# Patient Record
Sex: FEMALE | Race: WHITE | NOT HISPANIC OR LATINO | Employment: OTHER | URBAN - METROPOLITAN AREA
[De-identification: names, ages, dates, MRNs, and addresses within clinical notes are randomized per-mention and may not be internally consistent; named-entity substitution may affect disease eponyms.]

---

## 2023-12-04 ENCOUNTER — TRANSFERRED RECORDS (OUTPATIENT)
Dept: MULTI SPECIALTY CLINIC | Facility: CLINIC | Age: 73
End: 2023-12-04

## 2023-12-04 LAB
CREATININE (EXTERNAL): 0.6 MG/DL (ref 0.4–1)
GFR ESTIMATED (EXTERNAL): 94 ML/MIN/1.73M2
GLUCOSE (EXTERNAL): 102 MG/DL (ref 70–99)
POTASSIUM (EXTERNAL): 4.3 MEQ/L (ref 3.4–5.1)

## 2023-12-27 ENCOUNTER — HOSPITAL ENCOUNTER (OUTPATIENT)
Facility: CLINIC | Age: 73
Discharge: HOME | End: 2023-12-28
Attending: ORTHOPAEDIC SURGERY | Admitting: ORTHOPAEDIC SURGERY
Payer: COMMERCIAL

## 2023-12-27 ENCOUNTER — APPOINTMENT (OUTPATIENT)
Dept: RADIOLOGY | Facility: CLINIC | Age: 73
End: 2023-12-27
Attending: PHYSICIAN ASSISTANT
Payer: COMMERCIAL

## 2023-12-27 ENCOUNTER — ANESTHESIA EVENT (OUTPATIENT)
Dept: SURGERY | Facility: CLINIC | Age: 73
End: 2023-12-27
Payer: COMMERCIAL

## 2023-12-27 ENCOUNTER — ANESTHESIA (OUTPATIENT)
Dept: SURGERY | Facility: CLINIC | Age: 73
End: 2023-12-27
Payer: COMMERCIAL

## 2023-12-27 DIAGNOSIS — Z96.611 S/P REVERSE TOTAL SHOULDER ARTHROPLASTY, RIGHT: Primary | ICD-10-CM

## 2023-12-27 PROBLEM — Z98.890 STATUS POST SHOULDER SURGERY: Status: ACTIVE | Noted: 2023-12-27

## 2023-12-27 LAB
ABO/RH(D): NORMAL
ANTIBODY SCREEN: NEGATIVE
APTT PPP: 30 SECONDS (ref 22–38)
CREAT SERPL-MCNC: 0.5 MG/DL (ref 0.51–0.95)
EGFRCR SERPLBLD CKD-EPI 2021: >90 ML/MIN/1.73M2
ERYTHROCYTE [DISTWIDTH] IN BLOOD BY AUTOMATED COUNT: 14.8 % (ref 10–15)
GLUCOSE BLDC GLUCOMTR-MCNC: 86 MG/DL (ref 70–99)
HCT VFR BLD AUTO: 37.6 % (ref 35–47)
HGB BLD-MCNC: 12.1 G/DL (ref 11.7–15.7)
INR PPP: 0.99 (ref 0.85–1.15)
MCH RBC QN AUTO: 28.9 PG (ref 26.5–33)
MCHC RBC AUTO-ENTMCNC: 32.2 G/DL (ref 31.5–36.5)
MCV RBC AUTO: 90 FL (ref 78–100)
PLATELET # BLD AUTO: 228 10E3/UL (ref 150–450)
POTASSIUM SERPL-SCNC: 4.1 MMOL/L (ref 3.4–5.3)
RBC # BLD AUTO: 4.18 10E6/UL (ref 3.8–5.2)
SPECIMEN EXPIRATION DATE: NORMAL
WBC # BLD AUTO: 5.9 10E3/UL (ref 4–11)

## 2023-12-27 PROCEDURE — 250N000011 HC RX IP 250 OP 636: Performed by: ORTHOPAEDIC SURGERY

## 2023-12-27 PROCEDURE — 36415 COLL VENOUS BLD VENIPUNCTURE: CPT | Performed by: PHYSICIAN ASSISTANT

## 2023-12-27 PROCEDURE — 250N000011 HC RX IP 250 OP 636

## 2023-12-27 PROCEDURE — 84132 ASSAY OF SERUM POTASSIUM: CPT | Performed by: PHYSICIAN ASSISTANT

## 2023-12-27 PROCEDURE — 710N000010 HC RECOVERY PHASE 1, LEVEL 2, PER MIN: Performed by: ORTHOPAEDIC SURGERY

## 2023-12-27 PROCEDURE — 272N000001 HC OR GENERAL SUPPLY STERILE: Performed by: ORTHOPAEDIC SURGERY

## 2023-12-27 PROCEDURE — C1776 JOINT DEVICE (IMPLANTABLE): HCPCS | Performed by: ORTHOPAEDIC SURGERY

## 2023-12-27 PROCEDURE — 258N000003 HC RX IP 258 OP 636: Performed by: ANESTHESIOLOGY

## 2023-12-27 PROCEDURE — C1713 ANCHOR/SCREW BN/BN,TIS/BN: HCPCS | Performed by: ORTHOPAEDIC SURGERY

## 2023-12-27 PROCEDURE — C9290 INJ, BUPIVACAINE LIPOSOME: HCPCS | Performed by: ANESTHESIOLOGY

## 2023-12-27 PROCEDURE — 250N000011 HC RX IP 250 OP 636: Performed by: PHYSICIAN ASSISTANT

## 2023-12-27 PROCEDURE — 85027 COMPLETE CBC AUTOMATED: CPT | Performed by: PHYSICIAN ASSISTANT

## 2023-12-27 PROCEDURE — 82565 ASSAY OF CREATININE: CPT | Performed by: PHYSICIAN ASSISTANT

## 2023-12-27 PROCEDURE — 258N000003 HC RX IP 258 OP 636

## 2023-12-27 PROCEDURE — 82962 GLUCOSE BLOOD TEST: CPT

## 2023-12-27 PROCEDURE — 250N000011 HC RX IP 250 OP 636: Performed by: ANESTHESIOLOGY

## 2023-12-27 PROCEDURE — 999N000141 HC STATISTIC PRE-PROCEDURE NURSING ASSESSMENT: Performed by: ORTHOPAEDIC SURGERY

## 2023-12-27 PROCEDURE — 250N000013 HC RX MED GY IP 250 OP 250 PS 637: Performed by: PHYSICIAN ASSISTANT

## 2023-12-27 PROCEDURE — 370N000017 HC ANESTHESIA TECHNICAL FEE, PER MIN: Performed by: ORTHOPAEDIC SURGERY

## 2023-12-27 PROCEDURE — 85730 THROMBOPLASTIN TIME PARTIAL: CPT | Performed by: PHYSICIAN ASSISTANT

## 2023-12-27 PROCEDURE — 85610 PROTHROMBIN TIME: CPT | Performed by: PHYSICIAN ASSISTANT

## 2023-12-27 PROCEDURE — 360N000077 HC SURGERY LEVEL 4, PER MIN: Performed by: ORTHOPAEDIC SURGERY

## 2023-12-27 PROCEDURE — 250N000013 HC RX MED GY IP 250 OP 250 PS 637: Performed by: INTERNAL MEDICINE

## 2023-12-27 PROCEDURE — 999N000065 XR SHOULDER RIGHT PORT G/E 2 VIEWS: Mod: RT

## 2023-12-27 PROCEDURE — 86900 BLOOD TYPING SEROLOGIC ABO: CPT | Performed by: PHYSICIAN ASSISTANT

## 2023-12-27 PROCEDURE — 250N000009 HC RX 250

## 2023-12-27 PROCEDURE — 99204 OFFICE O/P NEW MOD 45 MIN: CPT | Performed by: INTERNAL MEDICINE

## 2023-12-27 DEVICE — SCREW PERIPHERAL 18MM DWJ318: Type: IMPLANTABLE DEVICE | Site: SHOULDER | Status: FUNCTIONAL

## 2023-12-27 DEVICE — SCREW PERIPHERAL 22MM: Type: IMPLANTABLE DEVICE | Site: SHOULDER | Status: FUNCTIONAL

## 2023-12-27 DEVICE — IMPLANTABLE DEVICE
Type: IMPLANTABLE DEVICE | Site: SHOULDER | Status: FUNCTIONAL
Brand: TORNIER PERFORM® REVERSED AUGMENTED GLENOID

## 2023-12-27 DEVICE — SCREW PERIPHERAL 26MM: Type: IMPLANTABLE DEVICE | Site: SHOULDER | Status: FUNCTIONAL

## 2023-12-27 DEVICE — SCREW CENTRAL 6.5X30MM DWJ130: Type: IMPLANTABLE DEVICE | Site: SHOULDER | Status: FUNCTIONAL

## 2023-12-27 DEVICE — IMPLANTABLE DEVICE
Type: IMPLANTABLE DEVICE | Site: SHOULDER | Status: FUNCTIONAL
Brand: TORNIER PERFORM™ HUMERAL SYSTEM

## 2023-12-27 RX ORDER — ONDANSETRON 2 MG/ML
4 INJECTION INTRAMUSCULAR; INTRAVENOUS EVERY 6 HOURS PRN
Status: DISCONTINUED | OUTPATIENT
Start: 2023-12-27 | End: 2023-12-28 | Stop reason: HOSPADM

## 2023-12-27 RX ORDER — ASPIRIN 81 MG/1
81 TABLET ORAL 2 TIMES DAILY
Status: DISCONTINUED | OUTPATIENT
Start: 2023-12-27 | End: 2023-12-28 | Stop reason: HOSPADM

## 2023-12-27 RX ORDER — PROCHLORPERAZINE MALEATE 5 MG
5 TABLET ORAL EVERY 6 HOURS PRN
Status: DISCONTINUED | OUTPATIENT
Start: 2023-12-27 | End: 2023-12-28 | Stop reason: HOSPADM

## 2023-12-27 RX ORDER — CEFAZOLIN SODIUM/WATER 2 G/20 ML
2 SYRINGE (ML) INTRAVENOUS
Status: COMPLETED | OUTPATIENT
Start: 2023-12-27 | End: 2023-12-27

## 2023-12-27 RX ORDER — TRAMADOL HYDROCHLORIDE 50 MG/1
50-100 TABLET ORAL EVERY 6 HOURS PRN
Status: DISCONTINUED | OUTPATIENT
Start: 2023-12-27 | End: 2023-12-28 | Stop reason: HOSPADM

## 2023-12-27 RX ORDER — LIDOCAINE 40 MG/G
CREAM TOPICAL
Status: DISCONTINUED | OUTPATIENT
Start: 2023-12-27 | End: 2023-12-28 | Stop reason: HOSPADM

## 2023-12-27 RX ORDER — TRAMADOL HYDROCHLORIDE 50 MG/1
50-100 TABLET ORAL EVERY 6 HOURS PRN
Qty: 10 TABLET | Refills: 0 | Status: SHIPPED | OUTPATIENT
Start: 2023-12-27 | End: 2023-12-30

## 2023-12-27 RX ORDER — HYDROXYZINE HYDROCHLORIDE 10 MG/1
10 TABLET, FILM COATED ORAL EVERY 6 HOURS PRN
Status: DISCONTINUED | OUTPATIENT
Start: 2023-12-27 | End: 2023-12-28 | Stop reason: HOSPADM

## 2023-12-27 RX ORDER — NALOXONE HYDROCHLORIDE 0.4 MG/ML
0.4 INJECTION, SOLUTION INTRAMUSCULAR; INTRAVENOUS; SUBCUTANEOUS
Status: DISCONTINUED | OUTPATIENT
Start: 2023-12-27 | End: 2023-12-28 | Stop reason: HOSPADM

## 2023-12-27 RX ORDER — LISINOPRIL 5 MG/1
2.5 TABLET ORAL DAILY
COMMUNITY

## 2023-12-27 RX ORDER — FENTANYL CITRATE 50 UG/ML
50 INJECTION, SOLUTION INTRAMUSCULAR; INTRAVENOUS
Status: DISCONTINUED | OUTPATIENT
Start: 2023-12-27 | End: 2023-12-27 | Stop reason: HOSPADM

## 2023-12-27 RX ORDER — FENTANYL CITRATE 50 UG/ML
100 INJECTION, SOLUTION INTRAMUSCULAR; INTRAVENOUS
Status: DISCONTINUED | OUTPATIENT
Start: 2023-12-27 | End: 2023-12-27 | Stop reason: HOSPADM

## 2023-12-27 RX ORDER — TRANEXAMIC ACID 650 MG/1
1950 TABLET ORAL ONCE
Status: COMPLETED | OUTPATIENT
Start: 2023-12-27 | End: 2023-12-27

## 2023-12-27 RX ORDER — ACETAMINOPHEN 325 MG/1
650 TABLET ORAL EVERY 4 HOURS PRN
Status: DISCONTINUED | OUTPATIENT
Start: 2023-12-30 | End: 2023-12-28 | Stop reason: HOSPADM

## 2023-12-27 RX ORDER — HYDRALAZINE HYDROCHLORIDE 20 MG/ML
10 INJECTION INTRAMUSCULAR; INTRAVENOUS EVERY 6 HOURS PRN
Status: DISCONTINUED | OUTPATIENT
Start: 2023-12-27 | End: 2023-12-28 | Stop reason: HOSPADM

## 2023-12-27 RX ORDER — ACETAMINOPHEN 325 MG/1
975 TABLET ORAL ONCE
Status: COMPLETED | OUTPATIENT
Start: 2023-12-27 | End: 2023-12-27

## 2023-12-27 RX ORDER — HYDROMORPHONE HCL IN WATER/PF 6 MG/30 ML
0.4 PATIENT CONTROLLED ANALGESIA SYRINGE INTRAVENOUS EVERY 5 MIN PRN
Status: DISCONTINUED | OUTPATIENT
Start: 2023-12-27 | End: 2023-12-27 | Stop reason: HOSPADM

## 2023-12-27 RX ORDER — AMOXICILLIN 250 MG
1 CAPSULE ORAL 2 TIMES DAILY
Status: DISCONTINUED | OUTPATIENT
Start: 2023-12-27 | End: 2023-12-28 | Stop reason: HOSPADM

## 2023-12-27 RX ORDER — HYDROMORPHONE HCL IN WATER/PF 6 MG/30 ML
0.4 PATIENT CONTROLLED ANALGESIA SYRINGE INTRAVENOUS
Status: DISCONTINUED | OUTPATIENT
Start: 2023-12-27 | End: 2023-12-28 | Stop reason: HOSPADM

## 2023-12-27 RX ORDER — ONDANSETRON 4 MG/1
4 TABLET, ORALLY DISINTEGRATING ORAL EVERY 6 HOURS PRN
Status: DISCONTINUED | OUTPATIENT
Start: 2023-12-27 | End: 2023-12-28 | Stop reason: HOSPADM

## 2023-12-27 RX ORDER — HYDROMORPHONE HCL IN WATER/PF 6 MG/30 ML
0.2 PATIENT CONTROLLED ANALGESIA SYRINGE INTRAVENOUS
Status: DISCONTINUED | OUTPATIENT
Start: 2023-12-27 | End: 2023-12-28 | Stop reason: HOSPADM

## 2023-12-27 RX ORDER — CEFAZOLIN SODIUM 1 G/3ML
1 INJECTION, POWDER, FOR SOLUTION INTRAMUSCULAR; INTRAVENOUS EVERY 8 HOURS
Qty: 10 ML | Refills: 0 | Status: COMPLETED | OUTPATIENT
Start: 2023-12-27 | End: 2023-12-28

## 2023-12-27 RX ORDER — VANCOMYCIN HYDROCHLORIDE 1 G/20ML
1 INJECTION, POWDER, LYOPHILIZED, FOR SOLUTION INTRAVENOUS ONCE
Status: DISCONTINUED | OUTPATIENT
Start: 2023-12-27 | End: 2023-12-27 | Stop reason: HOSPADM

## 2023-12-27 RX ORDER — LIDOCAINE 40 MG/G
CREAM TOPICAL
Status: DISCONTINUED | OUTPATIENT
Start: 2023-12-27 | End: 2023-12-27 | Stop reason: HOSPADM

## 2023-12-27 RX ORDER — PROPOFOL 10 MG/ML
INJECTION, EMULSION INTRAVENOUS PRN
Status: DISCONTINUED | OUTPATIENT
Start: 2023-12-27 | End: 2023-12-27

## 2023-12-27 RX ORDER — FENTANYL CITRATE 50 UG/ML
25 INJECTION, SOLUTION INTRAMUSCULAR; INTRAVENOUS EVERY 5 MIN PRN
Status: DISCONTINUED | OUTPATIENT
Start: 2023-12-27 | End: 2023-12-27 | Stop reason: HOSPADM

## 2023-12-27 RX ORDER — HYDROMORPHONE HCL IN WATER/PF 6 MG/30 ML
0.2 PATIENT CONTROLLED ANALGESIA SYRINGE INTRAVENOUS EVERY 5 MIN PRN
Status: DISCONTINUED | OUTPATIENT
Start: 2023-12-27 | End: 2023-12-27 | Stop reason: HOSPADM

## 2023-12-27 RX ORDER — SODIUM CHLORIDE, SODIUM LACTATE, POTASSIUM CHLORIDE, CALCIUM CHLORIDE 600; 310; 30; 20 MG/100ML; MG/100ML; MG/100ML; MG/100ML
INJECTION, SOLUTION INTRAVENOUS CONTINUOUS
Status: DISCONTINUED | OUTPATIENT
Start: 2023-12-27 | End: 2023-12-27 | Stop reason: HOSPADM

## 2023-12-27 RX ORDER — LISINOPRIL 2.5 MG/1
2.5 TABLET ORAL DAILY
Status: DISCONTINUED | OUTPATIENT
Start: 2023-12-27 | End: 2023-12-28 | Stop reason: HOSPADM

## 2023-12-27 RX ORDER — VANCOMYCIN HYDROCHLORIDE 1 G/20ML
INJECTION, POWDER, LYOPHILIZED, FOR SOLUTION INTRAVENOUS PRN
Status: DISCONTINUED | OUTPATIENT
Start: 2023-12-27 | End: 2023-12-27 | Stop reason: HOSPADM

## 2023-12-27 RX ORDER — ACETAMINOPHEN 325 MG/1
975 TABLET ORAL EVERY 8 HOURS
Qty: 27 TABLET | Refills: 0 | Status: DISCONTINUED | OUTPATIENT
Start: 2023-12-27 | End: 2023-12-28 | Stop reason: HOSPADM

## 2023-12-27 RX ORDER — DEXAMETHASONE SODIUM PHOSPHATE 10 MG/ML
INJECTION, SOLUTION INTRAMUSCULAR; INTRAVENOUS PRN
Status: DISCONTINUED | OUTPATIENT
Start: 2023-12-27 | End: 2023-12-27

## 2023-12-27 RX ORDER — ASPIRIN 81 MG/1
81 TABLET ORAL 2 TIMES DAILY WITH MEALS
Qty: 60 TABLET | Refills: 0 | Status: SHIPPED | OUTPATIENT
Start: 2023-12-27

## 2023-12-27 RX ORDER — BUPIVACAINE HYDROCHLORIDE 5 MG/ML
INJECTION, SOLUTION EPIDURAL; INTRACAUDAL
Status: COMPLETED | OUTPATIENT
Start: 2023-12-27 | End: 2023-12-27

## 2023-12-27 RX ORDER — NALOXONE HYDROCHLORIDE 0.4 MG/ML
0.2 INJECTION, SOLUTION INTRAMUSCULAR; INTRAVENOUS; SUBCUTANEOUS
Status: DISCONTINUED | OUTPATIENT
Start: 2023-12-27 | End: 2023-12-28 | Stop reason: HOSPADM

## 2023-12-27 RX ORDER — POLYETHYLENE GLYCOL 3350 17 G/17G
17 POWDER, FOR SOLUTION ORAL DAILY
Status: DISCONTINUED | OUTPATIENT
Start: 2023-12-28 | End: 2023-12-28 | Stop reason: HOSPADM

## 2023-12-27 RX ORDER — CEFAZOLIN SODIUM/WATER 2 G/20 ML
2 SYRINGE (ML) INTRAVENOUS SEE ADMIN INSTRUCTIONS
Status: DISCONTINUED | OUTPATIENT
Start: 2023-12-27 | End: 2023-12-27 | Stop reason: HOSPADM

## 2023-12-27 RX ORDER — PROPOFOL 10 MG/ML
INJECTION, EMULSION INTRAVENOUS CONTINUOUS PRN
Status: DISCONTINUED | OUTPATIENT
Start: 2023-12-27 | End: 2023-12-27

## 2023-12-27 RX ORDER — BISACODYL 10 MG
10 SUPPOSITORY, RECTAL RECTAL DAILY PRN
Status: DISCONTINUED | OUTPATIENT
Start: 2023-12-27 | End: 2023-12-28 | Stop reason: HOSPADM

## 2023-12-27 RX ORDER — FENTANYL CITRATE 50 UG/ML
50 INJECTION, SOLUTION INTRAMUSCULAR; INTRAVENOUS EVERY 5 MIN PRN
Status: DISCONTINUED | OUTPATIENT
Start: 2023-12-27 | End: 2023-12-27 | Stop reason: HOSPADM

## 2023-12-27 RX ORDER — DIPHENHYDRAMINE HCL 12.5 MG/5ML
12.5 SOLUTION ORAL EVERY 6 HOURS PRN
Status: DISCONTINUED | OUTPATIENT
Start: 2023-12-27 | End: 2023-12-28 | Stop reason: HOSPADM

## 2023-12-27 RX ORDER — ONDANSETRON 4 MG/1
4 TABLET, ORALLY DISINTEGRATING ORAL EVERY 30 MIN PRN
Status: DISCONTINUED | OUTPATIENT
Start: 2023-12-27 | End: 2023-12-27 | Stop reason: HOSPADM

## 2023-12-27 RX ORDER — ONDANSETRON 2 MG/ML
INJECTION INTRAMUSCULAR; INTRAVENOUS PRN
Status: DISCONTINUED | OUTPATIENT
Start: 2023-12-27 | End: 2023-12-27

## 2023-12-27 RX ORDER — SODIUM CHLORIDE, SODIUM LACTATE, POTASSIUM CHLORIDE, CALCIUM CHLORIDE 600; 310; 30; 20 MG/100ML; MG/100ML; MG/100ML; MG/100ML
INJECTION, SOLUTION INTRAVENOUS CONTINUOUS
Status: DISCONTINUED | OUTPATIENT
Start: 2023-12-27 | End: 2023-12-28 | Stop reason: HOSPADM

## 2023-12-27 RX ORDER — ONDANSETRON 2 MG/ML
4 INJECTION INTRAMUSCULAR; INTRAVENOUS EVERY 30 MIN PRN
Status: DISCONTINUED | OUTPATIENT
Start: 2023-12-27 | End: 2023-12-27 | Stop reason: HOSPADM

## 2023-12-27 RX ADMIN — SUGAMMADEX 200 MG: 100 INJECTION, SOLUTION INTRAVENOUS at 13:13

## 2023-12-27 RX ADMIN — DEXAMETHASONE SODIUM PHOSPHATE 5 MG: 10 INJECTION, SOLUTION INTRAMUSCULAR; INTRAVENOUS at 11:35

## 2023-12-27 RX ADMIN — ONDANSETRON 4 MG: 2 INJECTION INTRAMUSCULAR; INTRAVENOUS at 13:02

## 2023-12-27 RX ADMIN — ASPIRIN 81 MG: 81 TABLET, COATED ORAL at 21:24

## 2023-12-27 RX ADMIN — PROPOFOL 100 MG: 10 INJECTION, EMULSION INTRAVENOUS at 11:35

## 2023-12-27 RX ADMIN — ROCURONIUM BROMIDE 30 MG: 10 INJECTION, SOLUTION INTRAVENOUS at 11:35

## 2023-12-27 RX ADMIN — CEFAZOLIN 1 G: 1 INJECTION, POWDER, FOR SOLUTION INTRAMUSCULAR; INTRAVENOUS at 18:48

## 2023-12-27 RX ADMIN — PROPOFOL 200 MCG/KG/MIN: 10 INJECTION, EMULSION INTRAVENOUS at 11:35

## 2023-12-27 RX ADMIN — ROCURONIUM BROMIDE 20 MG: 10 INJECTION, SOLUTION INTRAVENOUS at 12:13

## 2023-12-27 RX ADMIN — PHENYLEPHRINE HYDROCHLORIDE 150 MCG: 10 INJECTION INTRAVENOUS at 11:48

## 2023-12-27 RX ADMIN — ACETAMINOPHEN 975 MG: 325 TABLET ORAL at 09:07

## 2023-12-27 RX ADMIN — TRAMADOL HYDROCHLORIDE 50 MG: 50 TABLET, COATED ORAL at 16:34

## 2023-12-27 RX ADMIN — TRANEXAMIC ACID 1950 MG: 650 TABLET ORAL at 09:06

## 2023-12-27 RX ADMIN — ACETAMINOPHEN 975 MG: 325 TABLET ORAL at 18:09

## 2023-12-27 RX ADMIN — TRAMADOL HYDROCHLORIDE 50 MG: 50 TABLET, COATED ORAL at 23:13

## 2023-12-27 RX ADMIN — MIDAZOLAM 1 MG: 1 INJECTION INTRAMUSCULAR; INTRAVENOUS at 09:46

## 2023-12-27 RX ADMIN — BUPIVACAINE 10 ML: 13.3 INJECTION, SUSPENSION, LIPOSOMAL INFILTRATION at 09:47

## 2023-12-27 RX ADMIN — FENTANYL CITRATE 50 MCG: 50 INJECTION, SOLUTION INTRAMUSCULAR; INTRAVENOUS at 09:46

## 2023-12-27 RX ADMIN — SENNOSIDES AND DOCUSATE SODIUM 1 TABLET: 8.6; 5 TABLET ORAL at 21:24

## 2023-12-27 RX ADMIN — SODIUM CHLORIDE, POTASSIUM CHLORIDE, SODIUM LACTATE AND CALCIUM CHLORIDE: 600; 310; 30; 20 INJECTION, SOLUTION INTRAVENOUS at 09:01

## 2023-12-27 RX ADMIN — SODIUM CHLORIDE, POTASSIUM CHLORIDE, SODIUM LACTATE AND CALCIUM CHLORIDE: 600; 310; 30; 20 INJECTION, SOLUTION INTRAVENOUS at 12:27

## 2023-12-27 RX ADMIN — Medication 2 G: at 11:25

## 2023-12-27 RX ADMIN — BUPIVACAINE HYDROCHLORIDE 3 ML: 5 INJECTION, SOLUTION EPIDURAL; INTRACAUDAL at 09:49

## 2023-12-27 RX ADMIN — PHENYLEPHRINE HYDROCHLORIDE 100 MCG: 10 INJECTION INTRAVENOUS at 11:55

## 2023-12-27 RX ADMIN — BUPIVACAINE HYDROCHLORIDE 12 ML: 5 INJECTION, SOLUTION EPIDURAL; INTRACAUDAL; PERINEURAL at 09:47

## 2023-12-27 RX ADMIN — PHENYLEPHRINE HYDROCHLORIDE 50 MCG: 10 INJECTION INTRAVENOUS at 11:49

## 2023-12-27 RX ADMIN — LISINOPRIL 2.5 MG: 2.5 TABLET ORAL at 18:09

## 2023-12-27 RX ADMIN — ROCURONIUM BROMIDE 20 MG: 10 INJECTION, SOLUTION INTRAVENOUS at 12:46

## 2023-12-27 ASSESSMENT — ACTIVITIES OF DAILY LIVING (ADL)
ADLS_ACUITY_SCORE: 20

## 2023-12-27 NOTE — ANESTHESIA PROCEDURE NOTES
Airway       Patient location during procedure: OR       Procedure Start/Stop Times: 12/27/2023 11:37 AM  Staff -        CRNA: Marilyn Steiner APRN CRNA       Performed By: CRNA  Consent for Airway        Urgency: elective  Indications and Patient Condition       Indications for airway management: marlyn-procedural and airway protection       Induction type:intravenous       Mask difficulty assessment: 1 - vent by mask    Final Airway Details       Final airway type: endotracheal airway       Successful airway: ETT - single  Endotracheal Airway Details        ETT size (mm): 6.5       Cuffed: yes       Successful intubation technique: direct laryngoscopy       DL Blade Type: MAC 3       Grade View of Cords: 1       Adjucts: stylet       Position: Left       Measured from: gums/teeth       Secured at (cm): 21       Bite block used: Soft    Post intubation assessment        Placement verified by: capnometry, equal breath sounds and chest rise        Number of attempts at approach: 1       Number of other approaches attempted: 0       Secured with: tape       Ease of procedure: easy       Dentition: Intact and Unchanged    Medication(s) Administered   Medication Administration Time: 12/27/2023 11:37 AM

## 2023-12-27 NOTE — ANESTHESIA POSTPROCEDURE EVALUATION
Patient: Elyssa Rich    Procedure: Procedure(s):  RIGHT REVERSE TOTAL SHOULDER ARTHROPLASTY       Anesthesia Type:  General    Note:     Postop Pain Control: Uneventful            Sign Out: Well controlled pain   PONV: No   Neuro/Psych: Uneventful            Sign Out: Acceptable/Baseline neuro status   Airway/Respiratory: Uneventful            Sign Out: Acceptable/Baseline resp. status   CV/Hemodynamics: Uneventful            Sign Out: Acceptable CV status; No obvious hypovolemia; No obvious fluid overload   Other NRE: NONE   DID A NON-ROUTINE EVENT OCCUR? No           Last vitals:  Vitals Value Taken Time   /70 12/27/23 1530   Temp     Pulse 75 12/27/23 1544   Resp 19 12/27/23 1405   SpO2 94 % 12/27/23 1544   Vitals shown include unfiled device data.    Electronically Signed By: Marcos Romero MD  December 27, 2023  3:46 PM

## 2023-12-27 NOTE — ANESTHESIA PROCEDURE NOTES
Brachial plexus Procedure Note    Pre-Procedure   Staff -        Anesthesiologist:  Marcos Romero MD       Performed By: anesthesiologist       Location: pre-op       Procedure Start/Stop Times: 12/27/2023 9:47 AM and 12/27/2023 9:49 AM       Pre-Anesthestic Checklist: patient identified, IV checked, site marked, risks and benefits discussed, informed consent, monitors and equipment checked, pre-op evaluation, at physician/surgeon's request and post-op pain management  Timeout:       Correct Patient: Yes        Correct Procedure: Yes        Correct Site: Yes        Correct Position: Yes        Correct Laterality: Yes        Site Marked: Yes  Procedure Documentation  Procedure: Brachial plexus       Laterality: right       Patient Position: supine       Patient Prep/Sterile Barriers: sterile gloves, mask       Skin prep: Chloraprep (interscalene approach).       Needle Type: short bevel       Needle Gauge: 22.        Needle Length (Inches): 2        Ultrasound guided       1. Ultrasound was used to identify targeted nerve, plexus, vascular marker, or fascial plane and place a needle adjacent to it in real-time.       2. Ultrasound was used to visualize the spread of anesthetic in close proximity to the above referenced structure.       3. A permanent image is entered into the patient's record.       4. The visualized anatomic structures appeared normal.       5. There were no apparent abnormal pathologic findings.    Assessment/Narrative         The placement was negative for: blood aspirated, painful injection and site bleeding       Paresthesias: No.       Bolus given via needle. no blood aspirated via catheter.        Secured via.        Insertion/Infusion Method: Single Shot       Complications: none       Injection made incrementally with aspirations every 5 mL.    Medication(s) Administered   Bupivacaine 0.5% PF (Infiltration) - Infiltration   12 mL - 12/27/2023 9:47:00 AM  Bupivacaine liposome  "(Exparel) 1.3% LA inj susp (Infiltration) - Infiltration   10 mL - 12/27/2023 9:47:00 AM  Medication Administration Time: 12/27/2023 9:47 AM      FOR [facility] ([facility]) ONLY:   Pain Team Contact information: please page the Pain Team Via [employer]. Search \"Pain\". During daytime hours, please page the attending first. At night please page the resident first.      "

## 2023-12-27 NOTE — OP NOTE
Operative Note    Name:  Elyssa Rich  :  1950  MRN:  [id number]  Procedure Date:  2023    Preoperative Diagnosis:  Right Shoulder DJD and thinning of the rotator cuff     Postoperative Diagnosis:  Same    Procedures:  1.Right Reverse Total Shoulder Arthroplasty  2.Open biceps tenodesis    Surgeon(s):   Lance Cha MD    Asst.   Marce Rodriguez PA-C PA-C assistance was required due to the complexity of the procedure, for patient positioning, instrumentation assistance, soft tissue retraction and patient safety.      Circulator: Ramiro Pleitez RN  Relief Circulator: Christiana Morrow RN  Relief Scrub: Gabriele Hassan  Scrub Person: Cynthia Nair      Anesthesia:   General and Interscalene block with Exparel     Estimated Blood Loss:  50 mL    Condition on discharge from OR:  stable    Drains: none     Specimens: None    Tissue Removed, Not Sent: Humeral head    Complications: none     Disposition:  Stable and awake to postanesthesia recovery..      INDICATION FOR OPERATION:  Elyssa Rich is a 73 year old female with a history of shoulder pain and stiffness and she has failed nonsurgical treatment. XR showed evidence of severe osteoarthritis of the shoulder. Recommended she undergo shoulder arthroplasty. Risks and benefits of the planned procedure as well as details of surgery were discussed with the patient. Consent was obtained.       REPORT OF OPERATION:   The patient was brought to operating room and placed supine on the operating table. An interscalene block was placed by Anesthesia preoperatively and she was given appropriate preoperative antibiotic. After induction of general anesthesia, she was placed in the beach-chair position on the operating room table. All bony prominences were well padded. The shoulder was prepped and draped in normal sterile fashion.    A standard anterior deltopectoral incision was utilized. Deep retractors were placed below the clavipectoral fascia and the  deltoid. The humeral head was inspected.  The rotator cuff was noted to be intact with moderate attenuation.   Biceps was tenodesed to the superior aspect of the pectoralis tendon. Subscapularis was released off the lesser tuberosity and tagged.    The humeral head was exposed and noted to have severe osteoarthritis of the the shoulder with anterior and inferior humeral osteophytes. Humeral head cut was then performed using appropriate instrumentation. The humeral head protection plate was placed and the glenoid was then exposed. The labrum was excised circumferentially.   Next, the glenoid was drilled and reamed and prepared for glenoid implant. The glenoid baseplate was then impacted and the peripheral screws were placed. A trial glenosphere was placed.   The humerus was then reamed for an inlay humeral stem.  The humeral component was trialed, and then the 36mm standard glenosphere implant was then placed.  The humerus was again exposed and the size 3 inlay Perform  humeral implant was impacted into place.  The humeral polyethelene was then again trialed and the +0 polyethelene cup was impacted into place.    A final reduction was performed, and the shoulder was copiously irrigated with saline irrigation. All instruments were removed. Subscapularis was repaired back to the lesser tuberosity with six #2 FiberWire sutures..The incison was again copiously irrigated with saline irrigation and 1 gram of vancomycin powder was placed in the shoulder joint and subdeltoid space.  The incision was closed in layers with #1 Ethibond closure of the deltopectoral split, 0 Vicryl and 2-0 Vicryl subcutaneous closure, and skin staples. A sterile dressing was placed on the shoulder.     Postoperatively she was placed in a shoulder SlingShot brace and  transferred in stable, awake condition to Postanesthesia Recovery.  Following surgery she will be maintained in the sling for 4-6 weeks postoperatively, may discontinue abduction  pillow at 1-2 week postoperative and begin PT at approximately 3-4 weeks postoperatively.        Lance Cha MD   Date: 12/27/2023  Time: 1:38 PM    Implants:  Implant Name Type Inv. Item Serial No.  Lot No. LRB No. Used Action   BASEPLATE LATERAL RVRS 25MM OFFS +3MM [id number] - [id number] Total Joint Component/Insert BASEPLATE LATERAL RVRS 25MM OFFS +3MM [id number] [employer] [facility]  Right 1 Implanted   SCREW CENTRAL 6.5X30MM [id number] - [id number] Metallic Hardware/[place] SCREW CENTRAL 6.5X30MM [id number]  [employer] NA Right 1 Implanted   SCREW PERIPHERAL 18MM [id number] - [id number] Metallic Hardware/[place] SCREW PERIPHERAL 18MM [id number]  [employer] NA Right 1 Implanted   SCREW PERIPHERAL 26MM - [id number] Metallic Hardware/[place] SCREW PERIPHERAL 26MM  [employer] NA Right 1 Implanted   SCREW PERIPHERAL 22MM - [id number] Metallic Hardware/[place] SCREW PERIPHERAL 22MM  [employer] NA Right 1 Implanted   Tornier Perform Reversed Cannulated CoCr Standard Glenosphere CoCr + [id number] Total Joint Component/Insert  [id number] [employer]  Right 1 Implanted   Reversed Insert, Thickness: +0 mm UHMWPE + [id number] Total Joint Component/Insert  [id number] [employer]  Right 1 Implanted   Humeral Stem, STD, SHORT [id number] + [id number] Total Joint Component/Insert  [id number] [employer]  Right 1 Implanted

## 2023-12-27 NOTE — ANESTHESIA PROCEDURE NOTES
Cervical Plexus (superficial) Procedure Note    Pre-Procedure   Staff -        Anesthesiologist:  Marcos Romero MD       Performed By: anesthesiologist       Location: pre-op       Procedure Start/Stop Times: 12/27/2023 9:49 AM and 12/27/2023 9:51 AM       Pre-Anesthestic Checklist: patient identified, IV checked, site marked, risks and benefits discussed, informed consent, monitors and equipment checked, pre-op evaluation, at physician/surgeon's request and post-op pain management  Timeout:       Correct Patient: Yes        Correct Procedure: Yes        Correct Site: Yes        Correct Position: Yes        Correct Laterality: Yes        Site Marked: Yes  Procedure Documentation  Procedure: Cervical Plexus (superficial)       Laterality: right       Patient Position: supine       Patient Prep/Sterile Barriers: sterile gloves, mask       Skin prep: Chloraprep       Needle Type: short bevel       Needle Gauge: 22.        Needle Length (Inches): 2        Ultrasound guided       1. Ultrasound was used to identify targeted nerve, plexus, vascular marker, or fascial plane and place a needle adjacent to it in real-time.       2. Ultrasound was used to visualize the spread of anesthetic in close proximity to the above referenced structure.       3. A permanent image is entered into the patient's record.       4. The visualized anatomic structures appeared normal.       5. There were no apparent abnormal pathologic findings.    Assessment/Narrative         The placement was negative for: blood aspirated, painful injection and site bleeding       Paresthesias: No.       Bolus given via needle. no blood aspirated via catheter.        Secured via.        Insertion/Infusion Method: Single Shot       Complications: none    Medication(s) Administered   Bupivacaine 0.5% PF (Infiltration) - Infiltration   3 mL - 12/27/2023 9:49:00 AM  Medication Administration Time: 12/27/2023 9:49 AM      FOR [facility] ([facility]) ONLY:    "Pain Team Contact information: please page the Pain Team Via [facility]. Search \"Pain\". During daytime hours, please page the attending first. At night please page the resident first.      "

## 2023-12-27 NOTE — CONSULTS
[facility]  Consult Note - Hospitalist Service  Date of Admission:  12/27/2023  Consult Requested by: Dr. Lance Cha  Reason for Consult: Medical management     Assessment & Plan   Elyssa Rich is a 73 year old female admitted on 12/27/2023. She presented for elective right total reverse shoulder arthroplasty.    POD #0 right total reverse shoulder arthroplasty  Postop care as per orthopedic surgery  Pain control  PT/OT    Hypertension-currently elevated  Resume home lisinopril  Hydralazine as needed     Clinically Significant Risk Factors Present on Admission                  # Hypertension: Home medication list includes antihypertensive(s)                 Christa Hernandez DO  Hospitalist Service  Securely message with 2d2c (more info)  Text page via [facility] Paging/Directory   ______________________________________________________________________    Chief Complaint   Right shoulder issues    History is obtained from the patient    History of Present Illness   Elyssa Rich is a 73 year old female who has a history of hypertension presenting for elective right reverse total shoulder arthroplasty.  Evaluated patient at bedside following procedure.  Notes no pain to right shoulder.  Concerned that she has difficulty moving her bilateral upper extremities.  No chest pain or shortness of breath.  No other complaints.      Past Medical History    Past Medical History:   Diagnosis Date    Arthritis     Hypertension        Past Surgical History   Past Surgical History:   Procedure Laterality Date    JOINT REPLACEMENT, HIP RT/LT Bilateral     Bilateral hip replacement    ORTHOPEDIC SURGERY         Medications   I have reviewed this patient's current medications       Review of Systems    The 10 point Review of Systems is negative other than noted in the HPI.     Social History   I have reviewed this patient's social history and updated it with pertinent information if needed.  Social  "History     Tobacco Use    Smoking status: Never    Smokeless tobacco: Never   Vaping Use    Vaping Use: Never used   Substance Use Topics    Alcohol use: Yes     Comment: Very rare (3 per year)    Drug use: Never         Family History     No significant family history.       Allergies   Allergies   Allergen Reactions    Oxycodone Other (See Comments)     Night terrors          Physical Exam   Vital Signs: Temp: 98.1  F (36.7  C) Temp src: Temporal BP: (!) 156/70 Pulse: 68   Resp: 10 SpO2: 93 % O2 Device: None (Room air) Oxygen Delivery: 6 LPM  Weight: 110 lbs 8 oz    GENRL: Alert and answering questions appropriately. Not in acute distress. Lying in bed   CHEST: Breathing easily   HEART: Regular rate .   EXTRM: Wiggling fingers and able to slightly lift right upper extremity.  Following commands.    NEURO: No involuntary movements. Normal mentation  PSYCH: Normal affect and mood.   INTGM: No skin rash    Medical Decision Making       35 MINUTES SPENT BY ME on the date of service doing chart review, history, exam, documentation & further activities per the note.      Data     I have personally reviewed the following data over the past 24 hrs:    5.9  \   12.1   / 228     N/A N/A N/A /  86   4.1 N/A 0.50 (L) \     INR:  0.99 PTT:  30   D-dimer:  N/A Fibrinogen:  N/A       Imaging results reviewed over the past 24 hrs:   Recent Results (from the past 24 hour(s))   POC US Guidance Needle Placement    Narrative    Ultrasound was performed as guidance to an anesthesia procedure.  Click   \"PACS images\" hyperlink below to view any stored images.  For specific   procedure details, view procedure note authored by anesthesia.   XR Shoulder Right Port G/E 2 Views    Narrative    EXAM: XR SHOULDER RIGHT PORT G/E 2 VIEWS  LOCATION: [facility]  DATE: 12/27/2023    INDICATION: Status post surgery  COMPARISON: 08/02/2022      Impression    IMPRESSION: Reverse total shoulder arthroplasty. Postoperative air " is present. Skin staples are in place. Components are well seated. No fractures are evident. Osteopenia.

## 2023-12-27 NOTE — ANESTHESIA CARE TRANSFER NOTE
Patient: Elyssa Rich    Procedure: Procedure(s):  RIGHT REVERSE TOTAL SHOULDER ARTHROPLASTY       Diagnosis: Right shoulder pain [M25.511]  Diagnosis Additional Information: No value filed.    Anesthesia Type:   General     Note:    Oropharynx: oropharynx clear of all foreign objects  Level of Consciousness: drowsy  Oxygen Supplementation: face mask  Level of Supplemental Oxygen (L/min / FiO2): 6  Independent Airway: airway patency satisfactory and stable  Dentition: dentition unchanged  Vital Signs Stable: post-procedure vital signs reviewed and stable  Report to RN Given: handoff report given  Patient transferred to: PACU    Handoff Report: Identifed the Patient, Identified the Reponsible Provider, Reviewed the pertinent medical history, Discussed the surgical course, Reviewed Intra-OP anesthesia mangement and issues during anesthesia, Set expectations for post-procedure period and Allowed opportunity for questions and acknowledgement of understanding      Vitals:  Vitals Value Taken Time   /74 12/27/23 1337   Temp 98.8 degree F    Pulse 73 12/27/23 1338   Resp 16 12/27/23 1338   SpO2 97 % 12/27/23 1338   Vitals shown include unfiled device data.    Electronically Signed By: NAHUN Hebert CRNA  December 27, 2023  1:39 PM

## 2023-12-27 NOTE — PHARMACY-ADMISSION MEDICATION HISTORY
Pharmacist Admission Medication History    Admission medication history is complete. The information provided in this note is only as accurate as the sources available at the time of the update.    Information Source(s): Patient and CareEverywhere/SureScripts via in-person    Pertinent Information:      Changes made to PTA medication list:  Added: Lisinopril  Deleted: None  Changed: None    Medication Affordability:  Not including over the counter (OTC) medications, was there a time in the past 3 months when you did not take your medications as prescribed because of cost?: No    Allergies reviewed with patient and updates made in EHR: yes    Medication History Completed By: Rj Chau RPH 12/27/2023 9:03 AM    PTA Med List   Medication Sig Last Dose    lisinopril (ZESTRIL) 5 MG tablet Take 2.5 mg by mouth daily 12/24/2023 at am

## 2023-12-27 NOTE — ANESTHESIA PREPROCEDURE EVALUATION
Anesthesia Pre-Procedure Evaluation    Patient: Elyssa Rich   MRN: [id number] : 1950        Procedure : Procedure(s):  RIGHT REVERSE TOTAL SHOULDER ARTHROPLASTY          Past Medical History:   Diagnosis Date    Arthritis     Hypertension       Past Surgical History:   Procedure Laterality Date    JOINT REPLACEMENT, HIP RT/LT Bilateral     Bilateral hip replacement    ORTHOPEDIC SURGERY        Allergies   Allergen Reactions    Oxycodone Other (See Comments)     Night terrors        Social History     Tobacco Use    Smoking status: Never    Smokeless tobacco: Never   Substance Use Topics    Alcohol use: Yes     Comment: Very rare (3 per year)      Wt Readings from Last 1 Encounters:   23 50.1 kg (110 lb 8 oz)        Anesthesia Evaluation   Pt has had prior anesthetic.     No history of anesthetic complications       ROS/MED HX  ENT/Pulmonary:  - neg pulmonary ROS     Neurologic:  - neg neurologic ROS     Cardiovascular:     (+)  hypertension- -   -  - -                                      METS/Exercise Tolerance:     Hematologic:  - neg hematologic  ROS     Musculoskeletal:   (+)  arthritis,             GI/Hepatic:  - neg GI/hepatic ROS     Renal/Genitourinary:  - neg Renal ROS     Endo:  - neg endo ROS     Psychiatric/Substance Use:       Infectious Disease:       Malignancy:       Other:            Physical Exam    Airway        Mallampati: II   TM distance: > 3 FB   Neck ROM: full   Mouth opening: > 3 cm    Respiratory Devices and Support         Dental       (+) Minor Abnormalities - some fillings, tiny chips      Cardiovascular          Rhythm and rate: regular and normal     Pulmonary           breath sounds clear to auscultation           OUTSIDE LABS:  CBC:   Lab Results   Component Value Date    WBC 5.9 2023    HGB 12.1 2023    HCT 37.6 2023     2023     BMP:   Lab Results   Component Value Date    POTASSIUM 4.1 2023    CR 0.50 (L) 2023    GLC  "86 12/27/2023     COAGS:   Lab Results   Component Value Date    PTT 30 12/27/2023    INR 0.99 12/27/2023     POC: No results found for: \"BGM\", \"HCG\", \"HCGS\"  HEPATIC: No results found for: \"ALBUMIN\", \"PROTTOTAL\", \"ALT\", \"AST\", \"GGT\", \"ALKPHOS\", \"BILITOTAL\", \"BILIDIRECT\", \"MCKENNA\"  OTHER: No results found for: \"PH\", \"LACT\", \"A1C\", \"LAKE\", \"PHOS\", \"MAG\", \"LIPASE\", \"AMYLASE\", \"TSH\", \"T4\", \"T3\", \"CRP\", \"SED\"    Anesthesia Plan    ASA Status:  2    NPO Status:  NPO Appropriate    Anesthesia Type: General.     - Airway: ETT   Induction: Intravenous.   Maintenance: TIVA.        Consents    Anesthesia Plan(s) and associated risks, benefits, and realistic alternatives discussed. Questions answered and patient/representative(s) expressed understanding.     - Discussed: Risks, Benefits and Alternatives for the PROCEDURE were discussed     - Discussed with:  Patient, Spouse            Postoperative Care    Pain management: Peripheral nerve block (Single Shot).   PONV prophylaxis: Ondansetron (or other 5HT-3), Dexamethasone or Solumedrol     Comments:               Marcos Romero MD    I have reviewed the pertinent notes and labs in the chart from the past 30 days and (re)examined the patient.  Any updates or changes from those notes are reflected in this note.                  "

## 2023-12-28 ENCOUNTER — APPOINTMENT (OUTPATIENT)
Dept: OCCUPATIONAL THERAPY | Facility: CLINIC | Age: 73
End: 2023-12-28
Attending: PHYSICIAN ASSISTANT
Payer: COMMERCIAL

## 2023-12-28 VITALS
OXYGEN SATURATION: 98 % | HEIGHT: 62 IN | HEART RATE: 55 BPM | TEMPERATURE: 97.7 F | RESPIRATION RATE: 16 BRPM | WEIGHT: 110.5 LBS | SYSTOLIC BLOOD PRESSURE: 115 MMHG | BODY MASS INDEX: 20.33 KG/M2 | DIASTOLIC BLOOD PRESSURE: 56 MMHG

## 2023-12-28 LAB
GLUCOSE BLDC GLUCOMTR-MCNC: 115 MG/DL (ref 70–99)
HGB BLD-MCNC: 11.2 G/DL (ref 11.7–15.7)

## 2023-12-28 PROCEDURE — 250N000013 HC RX MED GY IP 250 OP 250 PS 637: Performed by: INTERNAL MEDICINE

## 2023-12-28 PROCEDURE — 82962 GLUCOSE BLOOD TEST: CPT

## 2023-12-28 PROCEDURE — 99214 OFFICE O/P EST MOD 30 MIN: CPT | Performed by: INTERNAL MEDICINE

## 2023-12-28 PROCEDURE — 97535 SELF CARE MNGMENT TRAINING: CPT | Mod: GO

## 2023-12-28 PROCEDURE — 85018 HEMOGLOBIN: CPT | Performed by: PHYSICIAN ASSISTANT

## 2023-12-28 PROCEDURE — 250N000011 HC RX IP 250 OP 636: Performed by: PHYSICIAN ASSISTANT

## 2023-12-28 PROCEDURE — 97110 THERAPEUTIC EXERCISES: CPT | Mod: GO

## 2023-12-28 PROCEDURE — 97166 OT EVAL MOD COMPLEX 45 MIN: CPT | Mod: GO

## 2023-12-28 PROCEDURE — 250N000013 HC RX MED GY IP 250 OP 250 PS 637: Performed by: PHYSICIAN ASSISTANT

## 2023-12-28 PROCEDURE — 36415 COLL VENOUS BLD VENIPUNCTURE: CPT | Performed by: PHYSICIAN ASSISTANT

## 2023-12-28 RX ADMIN — SENNOSIDES AND DOCUSATE SODIUM 1 TABLET: 8.6; 5 TABLET ORAL at 09:05

## 2023-12-28 RX ADMIN — ACETAMINOPHEN 975 MG: 325 TABLET ORAL at 03:34

## 2023-12-28 RX ADMIN — ACETAMINOPHEN 975 MG: 325 TABLET ORAL at 10:49

## 2023-12-28 RX ADMIN — CEFAZOLIN 1 G: 1 INJECTION, POWDER, FOR SOLUTION INTRAMUSCULAR; INTRAVENOUS at 03:35

## 2023-12-28 RX ADMIN — LISINOPRIL 2.5 MG: 2.5 TABLET ORAL at 09:05

## 2023-12-28 RX ADMIN — ASPIRIN 81 MG: 81 TABLET, COATED ORAL at 09:05

## 2023-12-28 ASSESSMENT — ACTIVITIES OF DAILY LIVING (ADL)
ADLS_ACUITY_SCORE: 20

## 2023-12-28 NOTE — DISCHARGE SUMMARY
ORTHOPEDIC DISCHARGE SUMMARY       Elyssa Rich,  1950, MRN [id number]    Admission Date: 2023      Admission Diagnoses: Right shoulder pain [M25.511]  Status post shoulder surgery [Z98.890]     Discharge Date:      Post-operative Day:  1 Day Post-Op    Reason for Admission: The patient was admitted for the following: Procedure(s):  RIGHT REVERSE TOTAL SHOULDER ARTHROPLASTY    BRIEF HOSPITAL COURSE   Elyssa Rich is a pleasant 73 year old female who underwent the aforementioned procedure with Dr. Cha on 2023. There were no intraoperative complications and the patient was transferred to the recovery room and later the orthopedic unit in stable condition. Once the patient reached the orthopedic floor our orthopedic pain protocol was implemented along with the following:    Anticoagulation Medications: ASA  Therapy: None  Activity: WBAT  Bracing: Slingshot Brace    Consultations during Admission: Hospitalist service for medical management     COMPLICATIONS/SIGNIFICANT FINDINGS    none    DISCHARGE INFORMATION   Condition at discharge: Good  Discharge destination: Home  Patient was seen by myself on the date of discharge.    FOLLOW UP CARE   Follow up with orthopedics in 2 weeks or sooner should the need arise. Ortho will continue to manage pain control, post op anticoagulation and incision care.     Follow up with your PCP for management of chronic medical problems and to evaluate for post op medical complications including constipation, nausea/vomiting, DVT/PE, anemia, changes in blood pressure, fevers/chills, urinary retention and atelectasis/pneumonia.     Subjective   Patient is doing well on POD #1. Pain is well controlled with oral medications. Ambulating. Tolerating oral intake. Pain: controlled  Chest pain, SOB: none  Nausea, Vomiting:  none  Lightheadedness, Dizziness:  none  Neuro:  Patient denies new onset numbness or paresthesias     Patient is doing well. Tolerating diet,  "voiding, has sling brace on and fitting well. Ambulating well.     Physical Exam   /56   Pulse 55   Temp 97.7  F (36.5  C) (Oral)   Resp 16   Ht 1.575 m (5' 2\")   Wt 50.1 kg (110 lb 8 oz)   SpO2 98%   BMI 20.21 kg/m    The patient is A&Ox3. Appears comfortable.   Sensation is intact.  Hand  strength intact and functioning bilaterally. Good distal pulse bilaterally in upper extremity.   The incision is covered. Dressing C/D/I.     Pertinent Results at Discharge     Hemoglobin   Date/Time Value Ref Range Status   12/28/2023 04:43 AM 11.2 (L) 11.7 - 15.7 g/dL Final   12/27/2023 09:00 AM 12.1 11.7 - 15.7 g/dL Final     INR   Date/Time Value Ref Range Status   12/27/2023 09:00 AM 0.99 0.85 - 1.15 Final     Platelet Count   Date/Time Value Ref Range Status   12/27/2023 09:00  150 - 450 10e3/uL Final       Problem List   Principal Problem:    Status post shoulder surgery      Fernie Combs PA-C/Dr. Cha  [facility]  [phone]  Date: 12/28/2023  Time: 10:37 AM    "

## 2023-12-28 NOTE — PROGRESS NOTES
12/28/23 0942   Appointment Info   Signing Clinician's Name / Credentials (OT) Felipe Disla OTR/L   Quick Adds   Quick Adds Certification   Living Environment   People in Home spouse   Current Living Arrangements house   Home Accessibility stairs to enter home   Number of Stairs, Main Entrance 3   Stair Railings, Main Entrance railings safe and in good condition;railings on both sides of stairs   Transportation Anticipated family or friend will provide   Living Environment Comments No AD at baseline - will stay on main level at home. Pt has walkin shower w/ grab bars and shower chair, RTS   Self-Care   Usual Activity Tolerance good   Current Activity Tolerance good   Equipment Currently Used at Home shower chair;raised toilet seat;grab bar, tub/shower   Fall history within last six months yes   Number of times patient has fallen within last six months 1   Activity/Exercise/Self-Care Comment Pt IND w/ ADLs and IADLs at baseline   General Information   Onset of Illness/Injury or Date of Surgery 12/27/23   Referring Physician Lance Nathan MD   Patient/Family Therapy Goal Statement (OT) to go home   Additional Occupational Profile Info/Pertinent History of Current Problem Reverse R TSA   Existing Precautions/Restrictions shoulder   Right Upper Extremity (Weight-bearing Status) (S)  non weight-bearing (NWB)   Cognitive Status Examination   Orientation Status orientation to person, place and time   Affect/Mental Status (Cognitive) WNL   Visual Perception   Visual Impairment/Limitations corrective lenses full-time   Sensory   Sensory Comments numbness in RUE post op   Pain Assessment   Patient Currently in Pain No   Posture   Posture not impaired   Range of Motion Comprehensive   Comment, General Range of Motion no ROM of surgical shoulder post op   Strength Comprehensive (MMT)   General Manual Muscle Testing (MMT) Assessment no strength deficits identified   Bed Mobility   Bed Mobility supine-sit;sit-supine    Comment (Bed Mobility) SBA   Transfers   Transfers toilet transfer;shower transfer   Transfer Comments SBA   Activities of Daily Living   BADL Assessment/Intervention bathing;upper body dressing;lower body dressing   Bathing Assessment/Intervention   Edison Level (Bathing) minimum assist (75% patient effort)   Upper Body Dressing Assessment/Training   Edison Level (Upper Body Dressing) moderate assist (50% patient effort)   Lower Body Dressing Assessment/Training   Edison Level (Lower Body Dressing) supervision   Clinical Impression   Criteria for Skilled Therapeutic Interventions Met (OT) Yes, treatment indicated   OT Diagnosis decreased ADLs   Influenced by the following impairments TSA   OT Problem List-Impairments impacting ADL activity tolerance impaired;range of motion (ROM);sensation;post-surgical precautions   Assessment of Occupational Performance 3-5 Performance Deficits   Identified Performance Deficits dressing, bathing, transfers   Planned Therapy Interventions (OT) ADL retraining;ROM;home program guidelines;progressive activity/exercise;transfer training;bed mobility training   Clinical Decision Making Complexity (OT) detailed assessment/moderate complexity   Risk & Benefits of therapy have been explained evaluation/treatment results reviewed;patient;daughter   OT Total Evaluation Time   OT Eval, Moderate Complexity Minutes (96131) 10   Therapy Certification   Medical Diagnosis TSA   Start of Care Date 12/28/23   Certification date from 12/28/23   Certification date to 01/27/24   OT Goals   Therapy Frequency (OT) One time eval and treatment   OT Predicted Duration/Target Date for Goal Attainment 12/28/23   OT Goals Upper Body Dressing;Lower Body Dressing;Bed Mobility;Toilet Transfer/Toileting;OT Goal 1   OT: Upper Body Dressing Minimal assist;within precautions;Goal Met   OT: Lower Body Dressing within precautions;Modified independent;Goal Met   OT: Bed Mobility Modified  independent;supine to/from sitting;rolling;within precautions;Goal Met   OT: Toilet Transfer/Toileting Modified independent;toilet transfer;cleaning and garment management;within precautions;Goal Met   OT: Goal 1 Pt will be IND w/ HEP following initial teaching   Interventions   Interventions Quick Adds Self-Care/Home Management;Therapeutic Procedures/Exercise   Self-Care/Home Management   Self-Care/Home Mgmt/ADL, Compensatory, Meal Prep Minutes (19306) 24   Symptoms Noted During/After Treatment (Meal Preparation/Planning Training) none   Treatment Detail/Skilled Intervention Pt edu on shoulder px - pt verbalized understanding  and able to follow during session. Pt edu on FB dressing including donning/doffing immobilizer - completed w/ Min A from daughter. Edu handout given for compensatory strategies for UB dressing. Pt instructed on bed mobility techniques - completed Mod I. STS IND and amb. 250 ft, no AD and no LOB. Pt edu on safety technique for stairs using railing on L side - completed up/down 4 stairs w/ SBA. Pt edu on safe toilet/walkin shower transfers -completed Mod I; educ on UB bathing techniques. Pt ended session seated EOB and all questions answered.   Therapeutic Procedures/Exercise   Therapeutic Procedure: strength, endurance, ROM, flexibillity minutes (61181) 10   Symptoms Noted During/After Treatment none   Treatment Detail/Skilled Intervention Pt given edu handout on pendulum/UE exercises. Pt instructed on and completed 1x10 reps of pendelums, elbow flex/ext, supination/pronation, wrist flex/ext, ulnar/radial deviation, and fist pumps. Pt IND w/ HEP after initial cueing. Pt completed exercises using LUE due to numbness in RUE. Instructed to start exercises at home once sensation returns to RUE. Instructed to complete exercises 2x per day at home.   OT Discharge Planning   OT Plan DC OT   OT Discharge Recommendation (DC Rec)   (defer to ortho)   OT Rationale for DC Rec Pt doing very well and  competing ADLs Mod I. Pt will have daughter staying for 3 nights and  will also be able to assist as needed. Pt has all necessary DME.   OT Brief overview of current status Min A w/ dressing, Mod I w/ transfers/ADLs   Total Session Time   Timed Code Treatment Minutes 34   Total Session Time (sum of timed and untimed services) 44      [facility]  OUTPATIENT OCCUPATIONAL THERAPY  EVALUATION  PLAN OF TREATMENT FOR OUTPATIENT REHABILITATION  (COMPLETE FOR INITIAL CLAIMS ONLY)  Patient's Last Name, First Name, M.I.  YOB: 1950  Alondra Richh  EDGARDO                          Provider's Name  [facility] Medical Record No.  [id number]                             Onset Date:  12/27/23   Start of Care Date:  12/28/23   Type:     ___PT   _X_OT   ___SLP Medical Diagnosis:  TSA                    OT Diagnosis:  decreased ADLs Visits from SOC:  1     See note for plan of treatment, functional goals and certification details    I CERTIFY THE NEED FOR THESE SERVICES FURNISHED UNDER        THIS PLAN OF TREATMENT AND WHILE UNDER MY CARE     (Physician co-signature of this document indicates review and certification of the therapy plan).

## 2023-12-28 NOTE — PROGRESS NOTES
"Orthopedic Progress Note      Assessment: 1 Day Post-Op  S/P Procedure(s):  RIGHT REVERSE TOTAL SHOULDER ARTHROPLASTY @    Plan:   - Continue PT/OT  - Weightbearing status: as tolerated  - Anticoagulation: ASA in addition to SCDs, zeinab stockings and early ambulation.  - Discharge planning: plan to discharge to home today. Patient making great progress, has support from her family. Post-op questions, instructions, dressing, bracing, wound care and follow up information reviewed. Follow up in 2-weeks with Dr. Cha or sooner if needed.       Subjective:  Pain: controlled  Chest pain, SOB: none  Nausea, Vomiting:  none  Lightheadedness, Dizziness:  none  Neuro:  Patient denies new onset numbness or paresthesias    Patient is doing well. Tolerating diet, voiding, has sling brace on and fitting well. Ambulating well.     Objective:  /56   Pulse 67   Temp 97.7  F (36.5  C) (Oral)   Resp 16   Ht 1.575 m (5' 2\")   Wt 50.1 kg (110 lb 8 oz)   SpO2 94%   BMI 20.21 kg/m    The patient is A&Ox3. Appears comfortable.   Sensation is intact.  Hand  strength intact and functioning bilaterally. Good distal pulse bilaterally in upper extremity.   The incision is covered. Dressing C/D/I.     No drain none    Pertinent Labs   Lab Results: personally reviewed.   Lab Results   Component Value Date    INR 0.99 12/27/2023     Lab Results   Component Value Date    WBC 5.9 12/27/2023    HGB 11.2 (L) 12/28/2023    HCT 37.6 12/27/2023    MCV 90 12/27/2023     12/27/2023     No results found for: \"NA\", \"CO2\"      Report completed by:  Fernie Combs PA-C/Dr. Cha  [facility]    Date: 12/28/2023  Time: 10:31 AM    "

## 2023-12-28 NOTE — PROGRESS NOTES
Care Management Discharge Note    Discharge Date: 12/28/2023       Discharge Disposition: Home    Discharge Services: None    Discharge DME: None    Discharge Transportation: family or friend will provide    Does the patient's insurance plan have a 3 day qualifying hospital stay waiver?  No    Education Provided on the Discharge Plan: Yes  Persons Notified of Discharge Plans: Pt   Patient/Family in Agreement with the Plan: yes    Handoff Referral Completed: Yes    Additional Information:  Pt is discharging to home, family/friend to transport.     No needs from CM. Pt to follow up in clinic in 2 weeks.     Agustin Olivas RN

## 2023-12-28 NOTE — PROGRESS NOTES
[facility]    PROGRESS NOTE - Hospitalist Service    ASSESSMENT AND PLAN     Principal Problem:    Status post shoulder surgery    Elyssa Rich is a 73 year old female admitted on 12/27/2023. She presented for elective right total reverse shoulder arthroplasty.     POD #0 right total reverse shoulder arthroplasty  Postop care as per orthopedic surgery  Pain control  PT/OT     Hypertension-currently elevated  Resume home lisinopril  Hydralazine as needed    Anticipated length of stay: discharging today       Present on Admission:   Status post shoulder surgery      Subjective:  Patient feeling well today.  Ready for discharge.  No complaints    PHYSICAL EXAM  Temp:  [97.5  F (36.4  C)-97.9  F (36.6  C)] 97.7  F (36.5  C)  Pulse:  [55-82] 55  Resp:  [10-18] 16  BP: (111-162)/(51-78) 115/56  SpO2:  [92 %-98 %] 98 %  Wt Readings from Last 1 Encounters:   12/27/23 50.1 kg (110 lb 8 oz)       Intake/Output Summary (Last 24 hours) at 12/28/2023 1247  Last data filed at 12/28/2023 1038  Gross per 24 hour   Intake 1660 ml   Output 350 ml   Net 1310 ml      Body mass index is 20.21 kg/m .    GENRL: Alert and answering questions appropriately. Not in acute distress. Lying in bed   CHEST: Breathing easily   NEURO: No involuntary movements. Normal mentation  PSYCH: Normal affect and mood.   INTGM: No skin rash    Medical Decision Making       25 MINUTES SPENT BY ME on the date of service doing chart review, history, exam, documentation & further activities per the note.      PERTINENT LABS/IMAGING:  Results for orders placed or performed during the hospital encounter of 12/27/23   XR Shoulder Right Port G/E 2 Views    Impression    IMPRESSION: Reverse total shoulder arthroplasty. Postoperative air is present. Skin staples are in place. Components are well seated. No fractures are evident. Osteopenia.     Most Recent 3 CBC's:  Recent Labs   Lab Test 12/28/23  0443 12/27/23  0900   WBC  --  5.9   HGB  "11.2* 12.1   MCV  --  90   PLT  --  228     Most Recent 3 BMP's:  Recent Labs   Lab Test 12/28/23  0605 12/27/23  0905 12/27/23 0900   POTASSIUM  --   --  4.1   CR  --   --  0.50*   * 86  --      Most Recent 2 LFT's:No lab results found.    No results for input(s): \"CHOL\", \"HDL\", \"LDL\", \"TRIG\", \"CHOLHDLRATIO\" in the last 36749 hours.  No results for input(s): \"LDL\" in the last 71390 hours.  Recent Labs   Lab Test 12/28/23  0605 12/27/23 0905 12/27/23 0900   POTASSIUM  --   --  4.1   *   < >  --    CR  --   --  0.50*   GFRESTIMATED  --   --  >90    < > = values in this interval not displayed.     No results for input(s): \"A1C\" in the last 83833 hours.  Recent Labs   Lab Test 12/28/23  0443 12/27/23 0900   HGB 11.2* 12.1     No results for input(s): \"TROPONINI\" in the last 88976 hours.  No results for input(s): \"BNP\", \"NTBNPI\", \"NTBNP\" in the last 06203 hours.  No results for input(s): \"TSH\" in the last 41941 hours.  Recent Labs   Lab Test 12/27/23 0900   INR 0.99       Christa Hernandez, DO  Hospitalist [facility]      "

## 2023-12-28 NOTE — PLAN OF CARE
[id number]: A/ox4. VSS on RA. Slight numbness in fingers on right hand- can wiggle fingers. Pulses +2. Pain controlled with prn tramadol, given x1 on shift. Voiding adequately via bathroom. BS audible, +flatus. Up with SBA. No acute events on shift.       Problem: Adult Inpatient Plan of Care  Goal: Optimal Comfort and Wellbeing  Outcome: Progressing     Problem: Shoulder Arthroplasty (Total, Edmundo, Reverse)  Goal: Optimal Coping  Outcome: Progressing     Problem: Shoulder Arthroplasty (Total, Edmundo, Reverse)  Goal: Absence of Bleeding  Outcome: Progressing     Problem: Shoulder Arthroplasty (Total, Edmundo, Reverse)  Goal: Acceptable Pain Control  Outcome: Progressing

## (undated) DEVICE — CUSTOM PACK OPEN SHOULDER SOP5BOSHEB

## (undated) DEVICE — GLOVE UNDER INDICATOR PI SZ 8.5 LF 41685

## (undated) DEVICE — GLOVE BIOGEL PI ULTRATOUCH G SZ 6.5 42165

## (undated) DEVICE — SOL WATER IRRIG 1000ML BOTTLE 2F7114

## (undated) DEVICE — DRILL BIT PERIPHERAL SCREW 3.2MM MWJ126

## (undated) DEVICE — SUTURE VICRYL+ 2-0 27IN CT-1 UND VCP259H

## (undated) DEVICE — BLADE SAGITTAL WIDE (SO-618) 2108-118

## (undated) DEVICE — SUCTION MANIFOLD NEPTUNE 2 SYS 1 PORT 702-025-000

## (undated) DEVICE — GUIDEWIRE TORNIER AEQUALIS PERFORM +  2.5X220MM DWD017

## (undated) DEVICE — Device

## (undated) DEVICE — GLOVE BIOGEL PI ULTRATOUCH G SZ 8.5 42185

## (undated) DEVICE — PREP CHLORAPREP W/ORANGE TINT 10.5ML 930715

## (undated) DEVICE — SUTURE VICRYL+ 0 27IN CT-1 UND VCP260H

## (undated) DEVICE — ELECTRODE PATIENT RETURN ADULT L10 FT 2 PLATE CORD 0855C

## (undated) DEVICE — POSITIONER HEAD ADULT FP-HEADCR

## (undated) DEVICE — GLOVE UNDER INDICATOR PI SZ 7.0 LF 41670

## (undated) DEVICE — SU FIBERWIRE 2 38" T-8 NDL  AR-7206

## (undated) DEVICE — STPL SKIN PROXIMATE 35 WIDE PMW35

## (undated) DEVICE — SOL NACL 0.9% IRRIG 1000ML BOTTLE 2F7124

## (undated) DEVICE — GUIDE PIN STERILE 3X75MM

## (undated) DEVICE — SU ETHIBOND 2 V-37 4X30" MX69G

## (undated) DEVICE — HOLDER LIMB VELCRO OR 0814-1533

## (undated) DEVICE — SU NDL MAYO 1824-4

## (undated) DEVICE — GLOVE BIOGEL PI ULTRATOUCH G SZ 7.0 42170

## (undated) RX ORDER — FENTANYL CITRATE-0.9 % NACL/PF 10 MCG/ML
PLASTIC BAG, INJECTION (ML) INTRAVENOUS
Status: DISPENSED
Start: 2023-12-27

## (undated) RX ORDER — PROPOFOL 10 MG/ML
INJECTION, EMULSION INTRAVENOUS
Status: DISPENSED
Start: 2023-12-27